# Patient Record
Sex: FEMALE | Race: WHITE
[De-identification: names, ages, dates, MRNs, and addresses within clinical notes are randomized per-mention and may not be internally consistent; named-entity substitution may affect disease eponyms.]

---

## 2020-01-10 ENCOUNTER — HOSPITAL ENCOUNTER (OUTPATIENT)
Dept: HOSPITAL 47 - RADXRMAIN | Age: 85
Discharge: HOME | End: 2020-01-10
Attending: ANESTHESIOLOGY
Payer: MEDICARE

## 2020-01-10 DIAGNOSIS — Z71.3: ICD-10-CM

## 2020-01-10 DIAGNOSIS — E78.5: ICD-10-CM

## 2020-01-10 DIAGNOSIS — C90.00: ICD-10-CM

## 2020-01-10 DIAGNOSIS — F03.90: ICD-10-CM

## 2020-01-10 DIAGNOSIS — M19.90: Primary | ICD-10-CM

## 2020-01-10 PROCEDURE — 77075 RADEX OSSEOUS SURVEY COMPL: CPT

## 2020-01-12 NOTE — XR
EXAMINATION TYPE: XR bone survey complete

 

DATE OF EXAM: 1/10/2020

 

COMPARISON: NONE

 

HISTORY: Multiple myeloma

 

BONY CALVARIUM : There is diffuse osseous demineralization throughout.

 

2 views of the bony calvarium fail to demonstrate an evidence of a lytic punched-out lesion. Calvariu
m appears intact.

 

SPINE: Two views of the cervical, thoracic and lumbar spines are submitted.  Osseous fusion of C4 thr
ough at least C7 is seen, possibly congenital. Advanced degenerative change of the upper cervical spi
ne. No prevertebral soft tissue swelling or suspicious osseous lesion. Thoracic spine demonstrates se
win multilevel degenerative disc disease as is the lumbar spine with grade 1 anterolisthesis of L5 o
n S1. No suspicious lesion is seen given the limitation of overlying bowel gas in the lumbar spine. T
here is an S-shaped scoliosis of the spine.

 

PELVIS: Single view of the pelvis  demonstrates. There is a possible lytic lesion of the left superio
r acetabulum versus overlying bowel gas. Bowel gas limits evaluation of the pelvis throughout.

 

UPPER EXTREMITIES: Two views of the upper extremities. Degenerative changes of the glenohumeral joint
 and acromio clavicular joint on the right with sequela of chronic rotator cuff tear. Mild arthropath
y of the left shoulder. No lytic lesion of either humerus.

 

LOWER EXTREMITIES: 2 views of the lower extremities.  Moderate bilateral femoral acetabular arthropat
hy and mild to moderate degenerative changes of the knees. No lytic lesion is seen.

 

CHEST: Diffuse interstitial prominence is seen with enlarged cardiomediastinal silhouette. Probable t
race left pleural effusion blunting the costophrenic angle. No sizable pneumothorax.

 

IMPRESSION: Questionable small lytic lesion of the left superior acetabulum versus overlying bowel ga
s. The pelvis could be considered for further evaluation. The remainder the osseous structures demons
trate fused osseous demineralization and multifocal severe degenerative change without additional presley
picious lytic lesion.